# Patient Record
Sex: FEMALE | Race: WHITE | Employment: OTHER | URBAN - METROPOLITAN AREA
[De-identification: names, ages, dates, MRNs, and addresses within clinical notes are randomized per-mention and may not be internally consistent; named-entity substitution may affect disease eponyms.]

---

## 2022-10-31 LAB
AVERAGE GLUCOSE: NORMAL
HBA1C MFR BLD: 5.3 %

## 2023-02-27 ENCOUNTER — INITIAL CONSULT (OUTPATIENT)
Dept: CARDIOLOGY CLINIC | Age: 89
End: 2023-02-27
Payer: MEDICARE

## 2023-02-27 VITALS
HEART RATE: 67 BPM | SYSTOLIC BLOOD PRESSURE: 150 MMHG | BODY MASS INDEX: 23.87 KG/M2 | DIASTOLIC BLOOD PRESSURE: 90 MMHG | HEIGHT: 60 IN | WEIGHT: 121.6 LBS

## 2023-02-27 DIAGNOSIS — I10 PRIMARY HYPERTENSION: ICD-10-CM

## 2023-02-27 DIAGNOSIS — E78.00 PURE HYPERCHOLESTEROLEMIA: ICD-10-CM

## 2023-02-27 DIAGNOSIS — R01.1 MURMUR, CARDIAC: ICD-10-CM

## 2023-02-27 DIAGNOSIS — R06.02 SHORTNESS OF BREATH: Primary | ICD-10-CM

## 2023-02-27 PROCEDURE — 93000 ELECTROCARDIOGRAM COMPLETE: CPT | Performed by: INTERNAL MEDICINE

## 2023-02-27 PROCEDURE — 1090F PRES/ABSN URINE INCON ASSESS: CPT | Performed by: INTERNAL MEDICINE

## 2023-02-27 PROCEDURE — G8427 DOCREV CUR MEDS BY ELIG CLIN: HCPCS | Performed by: INTERNAL MEDICINE

## 2023-02-27 PROCEDURE — 99203 OFFICE O/P NEW LOW 30 MIN: CPT | Performed by: INTERNAL MEDICINE

## 2023-02-27 PROCEDURE — G8420 CALC BMI NORM PARAMETERS: HCPCS | Performed by: INTERNAL MEDICINE

## 2023-02-27 PROCEDURE — 1036F TOBACCO NON-USER: CPT | Performed by: INTERNAL MEDICINE

## 2023-02-27 PROCEDURE — G8484 FLU IMMUNIZE NO ADMIN: HCPCS | Performed by: INTERNAL MEDICINE

## 2023-02-27 PROCEDURE — 1123F ACP DISCUSS/DSCN MKR DOCD: CPT | Performed by: INTERNAL MEDICINE

## 2023-02-27 RX ORDER — OCTISALATE, AVOBENZONE, HOMOSALATE, AND OCTOCRYLENE 29.4; 29.4; 49; 25.48 MG/ML; MG/ML; MG/ML; MG/ML
LOTION TOPICAL DAILY
COMMUNITY

## 2023-02-27 RX ORDER — LANOLIN ALCOHOL/MO/W.PET/CERES
1000 CREAM (GRAM) TOPICAL DAILY
COMMUNITY

## 2023-02-27 RX ORDER — CHLORAL HYDRATE 500 MG
1 CAPSULE ORAL 2 TIMES DAILY WITH MEALS
COMMUNITY

## 2023-02-27 RX ORDER — ACETAMINOPHEN 160 MG
TABLET,DISINTEGRATING ORAL DAILY
COMMUNITY

## 2023-02-27 ASSESSMENT — ENCOUNTER SYMPTOMS
SORE THROAT: 0
BLURRED VISION: 0
SPUTUM PRODUCTION: 0
RHINORRHEA: 0
HEMATOCHEZIA: 0
WHEEZING: 0
VOMITING: 0
HEMOPTYSIS: 0
HEMATEMESIS: 0
SWOLLEN GLANDS: 0
BOWEL INCONTINENCE: 0
COLOR CHANGE: 0
ORTHOPNEA: 0
DIARRHEA: 0
ABDOMINAL PAIN: 0
HOARSE VOICE: 0
SHORTNESS OF BREATH: 1

## 2023-02-27 NOTE — PROGRESS NOTES
[facility] CARDIOLOGY  [address], [address]  PHONE: [phone]        23    NAME:  Connie Baumann  : 1929  MRN: [id number]       SUBJECTIVE:   Connie Baumann is a 81 y.o. female seen for a consultation visit regarding the following: The patient is referred by Dr Josh Harrison for evaluation of SOB. Chief Complaint   Patient presents with    Shortness of Breath            HPI:  Consultation is requested by [unfilled] for evaluation of Shortness of Breath   . Shortness of Breath  This is a recurrent problem. Episode onset: Worse over the past 6 months. The problem occurs intermittently. The problem has been unchanged. Pertinent negatives include no abdominal pain, chest pain, claudication, coryza, ear pain, fever, headaches, hemoptysis, leg pain, leg swelling, neck pain, orthopnea, PND, rash, rhinorrhea, sore throat, sputum production, swollen glands, syncope, vomiting or wheezing. The symptoms are aggravated by exercise. Past Medical History, Past Surgical History, Family history, Social History, and Medications were all reviewed with the patient today and updated as necessary.        No Known Allergies    Current Outpatient Medications:     bevacizumab (AVASTIN) 400 MG/16ML chemo injection, Infuse intravenously once, Disp: , Rfl:     sodium chloride (OCEAN, BABY AYR) 0.65 % nasal spray, 1 spray by Nasal route as needed for Congestion, Disp: , Rfl:     Probiotic Product (ALIGN) 4 MG CAPS, Take by mouth daily, Disp: , Rfl:     Cholecalciferol (VITAMIN D3) 50 MCG ( UT) CAPS, Take by mouth daily, Disp: , Rfl:     dextran 70-hypromellose, PF, (ARTIFICIAL TEARS) 0.1-0.3 % SOLN opthalmic solution, Place 1 drop into both eyes every 4 hours as needed, Disp: , Rfl:     NONFORMULARY, VISION ALIVE MAX, Disp: , Rfl:     BLACK ELDERBERRY PO, Take by mouth daily, Disp: , Rfl:     vitamin B-12 (CYANOCOBALAMIN) 1000 MCG tablet, Take 1,000 mcg by mouth daily, Disp: , Rfl:     Misc Natural Products (BLACK CHERRY CONCENTRATE PO), Take by mouth daily, Disp: , Rfl:     Multiple Vitamins-Minerals (PRESERVISION AREDS 2 PO), Take by mouth daily, Disp: , Rfl:     Omega-3 Fatty Acids (FISH OIL) 1000 MG capsule, Take 1 g by mouth 2 times daily (with meals), Disp: , Rfl:     Propylene Glycol 0.6 % SOLN, Apply to eye 2 times daily, Disp: , Rfl:   Past Medical History:   Diagnosis Date    Cerebral aneurysm     Hyperlipidemia     Hypertension      History reviewed. No pertinent surgical history. History reviewed. No pertinent family history. Social History     Tobacco Use    Smoking status: Never    Smokeless tobacco: Never   Substance Use Topics    Alcohol use: Not Currently       ROS:    Review of Systems   Constitutional: Negative for chills, decreased appetite, diaphoresis, fever and malaise/fatigue. HENT:  Negative for congestion, ear pain, hearing loss, hoarse voice, nosebleeds, rhinorrhea and sore throat. Eyes:  Negative for blurred vision. Cardiovascular:  Positive for dyspnea on exertion. Negative for chest pain, claudication, cyanosis, irregular heartbeat, leg swelling, near-syncope, orthopnea, palpitations, paroxysmal nocturnal dyspnea and syncope. Respiratory:  Positive for shortness of breath. Negative for hemoptysis, sputum production and wheezing. Endocrine: Negative for polydipsia, polyphagia and polyuria. Skin:  Negative for color change and rash. Musculoskeletal:  Negative for neck pain. Gastrointestinal:  Negative for abdominal pain, bowel incontinence, diarrhea, hematemesis, hematochezia and vomiting. Genitourinary:  Negative for dysuria, frequency and hematuria. Neurological:  Positive for vertigo. Negative for focal weakness, headaches, light-headedness, loss of balance, numbness, sensory change and weakness. Psychiatric/Behavioral:  Negative for altered mental status and memory loss.          PHYSICAL EXAM:   BP (!) 150/90   Pulse 67   Ht 5' (1.524 m)   Wt 121 lb 9.6 oz (55.2 kg)   BMI 23.75 kg/m²      Physical Exam  Constitutional:       Appearance: Normal appearance. HENT:      Head: Normocephalic and atraumatic. Nose: Nose normal.   Eyes:      Extraocular Movements: Extraocular movements intact. Pupils: Pupils are equal, round, and reactive to light. Neck:      Vascular: No carotid bruit. Cardiovascular:      Rate and Rhythm: Regular rhythm. Pulses: Normal pulses. Heart sounds: Murmur (2/6 LAWRENCE) heard. Pulmonary:      Effort: Pulmonary effort is normal.      Breath sounds: Normal breath sounds. Abdominal:      General: Abdomen is flat. Bowel sounds are normal.      Palpations: Abdomen is soft. Musculoskeletal:         General: Normal range of motion. Cervical back: Normal range of motion and neck supple. Skin:     General: Skin is warm and dry. Neurological:      General: No focal deficit present. Mental Status: She is alert and oriented to person, place, and time. Psychiatric:         Mood and Affect: Mood normal.       Medical problems and test results were reviewed with the patient today. Results for orders placed or performed in visit on 02/27/23   EKG 12 lead    Impression    Sinus  Rhythm   WITHIN NORMAL LIMITS         11/1/2022:Lipid panel:TC =218,HDL=65,TG=80,NIL=973. ASSESSMENT and PLAN    Dylan Graves was seen today for shortness of breath. Diagnoses and all orders for this visit:    Shortness of breath  -     EKG 12 lead  -     Transthoracic echocardiogram (TTE) complete with contrast, bubble, strain, and 3D PRN; Future    Primary hypertension:Recheck on return visit. Review home BP log. Consider pharmacologic intervention.  -     Transthoracic echocardiogram (TTE) complete with contrast, bubble, strain, and 3D PRN; Future    Murmur, cardiac  -     Transthoracic echocardiogram (TTE) complete with contrast, bubble, strain, and 3D PRN;  Future    Pure hypercholesterolemia:Elevated above goal.Followed by PCP. Disposition:  Return for Follow up after Echo. Thank you for allowing me to participate in this patient's care. Please call or contact me if there are any questions or concerns regarding the above.       Lauren Pina MD  02/27/23  12:37 PM

## 2023-04-24 ENCOUNTER — OFFICE VISIT (OUTPATIENT)
Dept: CARDIOLOGY CLINIC | Age: 89
End: 2023-04-24
Payer: MEDICARE

## 2023-04-24 VITALS
HEART RATE: 80 BPM | WEIGHT: 120.8 LBS | DIASTOLIC BLOOD PRESSURE: 80 MMHG | SYSTOLIC BLOOD PRESSURE: 130 MMHG | BODY MASS INDEX: 23.71 KG/M2 | HEIGHT: 60 IN

## 2023-04-24 DIAGNOSIS — R01.1 MURMUR, CARDIAC: Primary | ICD-10-CM

## 2023-04-24 DIAGNOSIS — R06.02 SHORTNESS OF BREATH: ICD-10-CM

## 2023-04-24 PROCEDURE — 1090F PRES/ABSN URINE INCON ASSESS: CPT | Performed by: INTERNAL MEDICINE

## 2023-04-24 PROCEDURE — 1036F TOBACCO NON-USER: CPT | Performed by: INTERNAL MEDICINE

## 2023-04-24 PROCEDURE — 1123F ACP DISCUSS/DSCN MKR DOCD: CPT | Performed by: INTERNAL MEDICINE

## 2023-04-24 PROCEDURE — 99213 OFFICE O/P EST LOW 20 MIN: CPT | Performed by: INTERNAL MEDICINE

## 2023-04-24 PROCEDURE — G8427 DOCREV CUR MEDS BY ELIG CLIN: HCPCS | Performed by: INTERNAL MEDICINE

## 2023-04-24 PROCEDURE — G8420 CALC BMI NORM PARAMETERS: HCPCS | Performed by: INTERNAL MEDICINE

## 2023-04-24 RX ORDER — MULTIVIT WITH MINERALS/LUTEIN
250 TABLET ORAL DAILY
COMMUNITY

## 2023-04-24 ASSESSMENT — ENCOUNTER SYMPTOMS
COLOR CHANGE: 0
SPUTUM PRODUCTION: 0
COUGH: 0
VOMITING: 0
NAUSEA: 0
VISUAL CHANGE: 0
ABDOMINAL PAIN: 0
SHORTNESS OF BREATH: 0
BOWEL INCONTINENCE: 0
WHEEZING: 0
ORTHOPNEA: 0
HEMATOCHEZIA: 0
SWOLLEN GLANDS: 0
DIARRHEA: 0
HOARSE VOICE: 0
BLURRED VISION: 0
CHANGE IN BOWEL HABIT: 0
SORE THROAT: 0
HEMATEMESIS: 0

## 2023-04-24 NOTE — PROGRESS NOTES
BLACK ELDERBERRY PO, Take by mouth daily, Disp: , Rfl:     vitamin B-12 (CYANOCOBALAMIN) 1000 MCG tablet, Take 1 tablet by mouth daily, Disp: , Rfl:     Misc Natural Products (BLACK CHERRY CONCENTRATE PO), Take by mouth daily, Disp: , Rfl:     Multiple Vitamins-Minerals (PRESERVISION AREDS 2 PO), Take by mouth daily, Disp: , Rfl:   No Known Allergies  Past Medical History:   Diagnosis Date    Cerebral aneurysm     Hyperlipidemia     Hypertension      History reviewed. No pertinent surgical history. History reviewed. No pertinent family history. Social History     Tobacco Use    Smoking status: Never    Smokeless tobacco: Never   Substance Use Topics    Alcohol use: Not Currently       ROS:    Review of Systems   Constitutional: Negative for chills, decreased appetite, diaphoresis, fatigue, fever and malaise/fatigue. HENT:  Negative for congestion, hearing loss, hoarse voice, nosebleeds and sore throat. Eyes:  Negative for blurred vision. Cardiovascular:  Positive for dyspnea on exertion. Negative for chest pain, claudication, cyanosis, irregular heartbeat, leg swelling, near-syncope, orthopnea, palpitations, paroxysmal nocturnal dyspnea and syncope. Respiratory:  Negative for cough, shortness of breath, sputum production and wheezing. Endocrine: Negative for polydipsia, polyphagia and polyuria. Skin:  Negative for color change and rash. Musculoskeletal:  Negative for arthralgias, joint swelling, myalgias and neck pain. Gastrointestinal:  Negative for abdominal pain, anorexia, change in bowel habit, bowel incontinence, diarrhea, hematemesis, hematochezia, nausea and vomiting. Genitourinary:  Negative for dysuria, frequency and hematuria. Neurological:  Negative for focal weakness, headaches, light-headedness, loss of balance, numbness, sensory change, vertigo and weakness. Psychiatric/Behavioral:  Negative for altered mental status and memory loss.           PHYSICAL EXAM:   /80

## 2024-02-06 ENCOUNTER — OFFICE VISIT (OUTPATIENT)
Dept: FAMILY MEDICINE CLINIC | Facility: CLINIC | Age: 89
End: 2024-02-06
Payer: MEDICARE

## 2024-02-06 VITALS
SYSTOLIC BLOOD PRESSURE: 158 MMHG | RESPIRATION RATE: 20 BRPM | BODY MASS INDEX: 24.15 KG/M2 | DIASTOLIC BLOOD PRESSURE: 87 MMHG | OXYGEN SATURATION: 96 % | HEIGHT: 60 IN | TEMPERATURE: 97.8 F | WEIGHT: 123 LBS | HEART RATE: 74 BPM

## 2024-02-06 DIAGNOSIS — I10 PRIMARY HYPERTENSION: ICD-10-CM

## 2024-02-06 DIAGNOSIS — E78.00 PURE HYPERCHOLESTEROLEMIA: ICD-10-CM

## 2024-02-06 DIAGNOSIS — G56.03 BILATERAL CARPAL TUNNEL SYNDROME: ICD-10-CM

## 2024-02-06 DIAGNOSIS — I10 PRIMARY HYPERTENSION: Primary | ICD-10-CM

## 2024-02-06 LAB
ALBUMIN SERPL-MCNC: 3.7 G/DL (ref 3.2–4.6)
ALBUMIN/GLOB SERPL: 1.1 (ref 0.4–1.6)
ALP SERPL-CCNC: 87 U/L (ref 50–136)
ALT SERPL-CCNC: 19 U/L (ref 12–65)
ANION GAP SERPL CALC-SCNC: 3 MMOL/L (ref 2–11)
AST SERPL-CCNC: 17 U/L (ref 15–37)
BASOPHILS # BLD: 0 K/UL (ref 0–0.2)
BASOPHILS NFR BLD: 1 % (ref 0–2)
BILIRUB SERPL-MCNC: 0.5 MG/DL (ref 0.2–1.1)
BUN SERPL-MCNC: 19 MG/DL (ref 8–23)
CALCIUM SERPL-MCNC: 9.3 MG/DL (ref 8.3–10.4)
CHLORIDE SERPL-SCNC: 108 MMOL/L (ref 103–113)
CHOLEST SERPL-MCNC: 189 MG/DL
CO2 SERPL-SCNC: 29 MMOL/L (ref 21–32)
CREAT SERPL-MCNC: 0.8 MG/DL (ref 0.6–1)
DIFFERENTIAL METHOD BLD: ABNORMAL
EOSINOPHIL # BLD: 0.1 K/UL (ref 0–0.8)
EOSINOPHIL NFR BLD: 2 % (ref 0.5–7.8)
ERYTHROCYTE [DISTWIDTH] IN BLOOD BY AUTOMATED COUNT: 13 % (ref 11.9–14.6)
GLOBULIN SER CALC-MCNC: 3.5 G/DL (ref 2.8–4.5)
GLUCOSE SERPL-MCNC: 101 MG/DL (ref 65–100)
HCT VFR BLD AUTO: 39.1 % (ref 35.8–46.3)
HDLC SERPL-MCNC: 61 MG/DL (ref 40–60)
HDLC SERPL: 3.1
HGB BLD-MCNC: 12.4 G/DL (ref 11.7–15.4)
IMM GRANULOCYTES # BLD AUTO: 0 K/UL (ref 0–0.5)
IMM GRANULOCYTES NFR BLD AUTO: 0 % (ref 0–5)
LDLC SERPL CALC-MCNC: 111.2 MG/DL
LYMPHOCYTES # BLD: 1.1 K/UL (ref 0.5–4.6)
LYMPHOCYTES NFR BLD: 26 % (ref 13–44)
MCH RBC QN AUTO: 33.2 PG (ref 26.1–32.9)
MCHC RBC AUTO-ENTMCNC: 31.7 G/DL (ref 31.4–35)
MCV RBC AUTO: 104.8 FL (ref 82–102)
MONOCYTES # BLD: 0.4 K/UL (ref 0.1–1.3)
MONOCYTES NFR BLD: 10 % (ref 4–12)
NEUTS SEG # BLD: 2.6 K/UL (ref 1.7–8.2)
NEUTS SEG NFR BLD: 61 % (ref 43–78)
NRBC # BLD: 0 K/UL (ref 0–0.2)
PLATELET # BLD AUTO: 240 K/UL (ref 150–450)
PMV BLD AUTO: 9.5 FL (ref 9.4–12.3)
POTASSIUM SERPL-SCNC: 4.1 MMOL/L (ref 3.5–5.1)
PROT SERPL-MCNC: 7.2 G/DL (ref 6.3–8.2)
RBC # BLD AUTO: 3.73 M/UL (ref 4.05–5.2)
SODIUM SERPL-SCNC: 140 MMOL/L (ref 136–146)
TRIGL SERPL-MCNC: 84 MG/DL (ref 35–150)
VLDLC SERPL CALC-MCNC: 16.8 MG/DL (ref 6–23)
WBC # BLD AUTO: 4.2 K/UL (ref 4.3–11.1)

## 2024-02-06 PROCEDURE — 1123F ACP DISCUSS/DSCN MKR DOCD: CPT | Performed by: NURSE PRACTITIONER

## 2024-02-06 PROCEDURE — G8420 CALC BMI NORM PARAMETERS: HCPCS | Performed by: NURSE PRACTITIONER

## 2024-02-06 PROCEDURE — 1036F TOBACCO NON-USER: CPT | Performed by: NURSE PRACTITIONER

## 2024-02-06 PROCEDURE — G8427 DOCREV CUR MEDS BY ELIG CLIN: HCPCS | Performed by: NURSE PRACTITIONER

## 2024-02-06 PROCEDURE — 1090F PRES/ABSN URINE INCON ASSESS: CPT | Performed by: NURSE PRACTITIONER

## 2024-02-06 PROCEDURE — G8484 FLU IMMUNIZE NO ADMIN: HCPCS | Performed by: NURSE PRACTITIONER

## 2024-02-06 PROCEDURE — 99213 OFFICE O/P EST LOW 20 MIN: CPT | Performed by: NURSE PRACTITIONER

## 2024-02-06 ASSESSMENT — ENCOUNTER SYMPTOMS
GASTROINTESTINAL NEGATIVE: 1
RESPIRATORY NEGATIVE: 1

## 2024-02-06 ASSESSMENT — PATIENT HEALTH QUESTIONNAIRE - PHQ9
SUM OF ALL RESPONSES TO PHQ QUESTIONS 1-9: 0
1. LITTLE INTEREST OR PLEASURE IN DOING THINGS: 0
SUM OF ALL RESPONSES TO PHQ QUESTIONS 1-9: 0
SUM OF ALL RESPONSES TO PHQ QUESTIONS 1-9: 0
SUM OF ALL RESPONSES TO PHQ9 QUESTIONS 1 & 2: 0
SUM OF ALL RESPONSES TO PHQ QUESTIONS 1-9: 0
2. FEELING DOWN, DEPRESSED OR HOPELESS: 0

## 2024-02-06 NOTE — PROGRESS NOTES
Musculoskeletal:      Thoracic back: Deformity (kyphosis) present.   Neurological:      Mental Status: She is alert and oriented to person, place, and time.   Psychiatric:         Attention and Perception: Attention normal.         Mood and Affect: Mood normal.         Speech: Speech normal.         Behavior: Behavior is cooperative.         Thought Content: Thought content normal.         Assessment/Plan:  1. Primary hypertension    2. Pure hypercholesterolemia    3. Bilateral carpal tunnel syndrome          Maci was seen today for new patient and pt requesting handicap placard.    Diagnoses and all orders for this visit:    Primary hypertension  -     CBC with Auto Differential; Future  -     Comprehensive Metabolic Panel; Future  -     Lipid Panel; Future    Pure hypercholesterolemia  -     CBC with Auto Differential; Future  -     Comprehensive Metabolic Panel; Future  -     Lipid Panel; Future    Bilateral carpal tunnel syndrome  -     [facility]       No orders of the defined types were placed in this encounter.      Return in about 6 months (around 8/6/2024), or with new pcp.    Referral made to [facility] for carpal tunnel bilateral, she is currently wearing splints, labs obtained today will call with results.  Reviewed patients past medical history. We discussed expected course, resolution, and complications of diagnosis in detail . Discussed treatment plan and follow up. Patient to return if symptoms persist or worsen.    VENKATESH Sargent - ADRIA

## 2024-02-07 NOTE — RESULT ENCOUNTER NOTE
Please inform patient overall her labs were ok, her low density cholesterol was slightly elevated at 111, she can lower this number by eating a heart healthy diet low in fat,

## 2024-04-24 ENCOUNTER — OFFICE VISIT (OUTPATIENT)
Age: 89
End: 2024-04-24
Payer: MEDICARE

## 2024-04-24 VITALS
HEIGHT: 60 IN | BODY MASS INDEX: 24.54 KG/M2 | SYSTOLIC BLOOD PRESSURE: 140 MMHG | HEART RATE: 78 BPM | DIASTOLIC BLOOD PRESSURE: 78 MMHG | WEIGHT: 125 LBS

## 2024-04-24 DIAGNOSIS — R01.1 MURMUR, CARDIAC: ICD-10-CM

## 2024-04-24 DIAGNOSIS — I10 PRIMARY HYPERTENSION: Primary | ICD-10-CM

## 2024-04-24 PROCEDURE — G8427 DOCREV CUR MEDS BY ELIG CLIN: HCPCS | Performed by: INTERNAL MEDICINE

## 2024-04-24 PROCEDURE — 1036F TOBACCO NON-USER: CPT | Performed by: INTERNAL MEDICINE

## 2024-04-24 PROCEDURE — G8420 CALC BMI NORM PARAMETERS: HCPCS | Performed by: INTERNAL MEDICINE

## 2024-04-24 PROCEDURE — 1123F ACP DISCUSS/DSCN MKR DOCD: CPT | Performed by: INTERNAL MEDICINE

## 2024-04-24 PROCEDURE — 1090F PRES/ABSN URINE INCON ASSESS: CPT | Performed by: INTERNAL MEDICINE

## 2024-04-24 PROCEDURE — 93000 ELECTROCARDIOGRAM COMPLETE: CPT | Performed by: INTERNAL MEDICINE

## 2024-04-24 PROCEDURE — 99213 OFFICE O/P EST LOW 20 MIN: CPT | Performed by: INTERNAL MEDICINE

## 2024-04-24 ASSESSMENT — ENCOUNTER SYMPTOMS
ABDOMINAL PAIN: 0
ORTHOPNEA: 0
BOWEL INCONTINENCE: 0
WHEEZING: 0
HEMATEMESIS: 0
RHINORRHEA: 0
DIARRHEA: 0
HEMOPTYSIS: 0
COLOR CHANGE: 0
SORE THROAT: 0
SPUTUM PRODUCTION: 0
SHORTNESS OF BREATH: 1
SWOLLEN GLANDS: 0
HEMATOCHEZIA: 0
HOARSE VOICE: 0
VOMITING: 0
BLURRED VISION: 0

## 2024-04-24 NOTE — PROGRESS NOTES
[facility] CARDIOLOGY  [address] [place] [address], [address]  [place] [address]  PHONE: [phone]        24        NAME:  Maci Thomas  : 1929  MRN: [id number]       SUBJECTIVE:   Maci Thomas is a 89 y.o. female seen for a follow up visit regarding the following: She has a hx of moderate MR.She returns for annual follow up.Overall,she reports doing ok.    Chief Complaint   Patient presents with    Hypertension    Heart Murmur       HPI:    Shortness of Breath  This is a chronic problem. The problem has been unchanged. Pertinent negatives include no abdominal pain, chest pain, claudication, coryza, ear pain, fever, headaches, hemoptysis, leg pain, leg swelling, neck pain, orthopnea, PND, rash, rhinorrhea, sore throat, sputum production, swollen glands, syncope, vomiting or wheezing. Exacerbated by: exertion.       Past Medical History, Past Surgical History, Family history, Social History, and Medications were all reviewed with the patient today and updated as necessary.         Current Outpatient Medications:     Ascorbic Acid (VITAMIN C) 500 MG CAPS, Take 500 mg by mouth daily, Disp: , Rfl:     bevacizumab (AVASTIN) 400 MG/16ML chemo injection, Infuse intravenously once, Disp: , Rfl:     sodium chloride (OCEAN, BABY AYR) 0.65 % nasal spray, 1 spray by Nasal route as needed for Congestion, Disp: , Rfl:     Probiotic Product (ALIGN) 4 MG CAPS, Take by mouth daily, Disp: , Rfl:     Cholecalciferol (VITAMIN D3) 50 MCG ( UT) CAPS, Take by mouth daily, Disp: , Rfl:     NONFORMULARY, VISION ALIVE MAX, Disp: , Rfl:     Omega-3 Fatty Acids (FISH OIL) 1000 MG capsule, Take 1 capsule by mouth 2 times daily (with meals), Disp: , Rfl:     Propylene Glycol 0.6 % SOLN, Apply to eye 2 times daily, Disp: , Rfl:     BLACK ELDERBERRY PO, Take by mouth daily, Disp: , Rfl:     vitamin B-12 (CYANOCOBALAMIN) 1000 MCG tablet, Take 1 tablet by mouth daily, Disp: , Rfl:     Multiple Vitamins-Minerals (PRESERVISION

## 2024-07-12 ENCOUNTER — TELEPHONE (OUTPATIENT)
Dept: FAMILY MEDICINE CLINIC | Facility: CLINIC | Age: 89
End: 2024-07-12

## 2024-07-12 NOTE — TELEPHONE ENCOUNTER
----- Message from Franklin Villatoro sent at 7/11/2024 12:20 PM EDT -----  Regarding: E[facility] Appointment Request  ECC Appointment Request    Patient needs appointment for ECC Appointment Type: New to Provider.    Patient Requested Dates(s): Anyday on the month of August  Patient Requested Time: Any time would be fine for the patient  Provider Name: Any provider who has the earliest availability.    Reason for Appointment Request: Established Patient - No appointments available during search: Patient needs to be an established patient in any of the provider who is available for the month of August!  --------------------------------------------------------------------------------------------------------------------------    Relationship to Patient: Self     Call Back Information: OK to leave message on voicemail ( If it has the name of the practice )  Preferred Call Back Number: Phone [phone]

## 2024-10-15 ENCOUNTER — OFFICE VISIT (OUTPATIENT)
Dept: FAMILY MEDICINE CLINIC | Facility: CLINIC | Age: 89
End: 2024-10-15
Payer: MEDICARE

## 2024-10-15 DIAGNOSIS — Z59.82 TRANSPORTATION INSECURITY: ICD-10-CM

## 2024-10-15 DIAGNOSIS — I10 PRIMARY HYPERTENSION: ICD-10-CM

## 2024-10-15 DIAGNOSIS — R01.1 MURMUR, CARDIAC: ICD-10-CM

## 2024-10-15 DIAGNOSIS — Z76.89 ESTABLISHING CARE WITH NEW DOCTOR, ENCOUNTER FOR: Primary | ICD-10-CM

## 2024-10-15 DIAGNOSIS — M40.209 KYPHOSIS, UNSPECIFIED KYPHOSIS TYPE, UNSPECIFIED SPINAL REGION: ICD-10-CM

## 2024-10-15 PROCEDURE — 99213 OFFICE O/P EST LOW 20 MIN: CPT

## 2024-10-15 PROCEDURE — 1123F ACP DISCUSS/DSCN MKR DOCD: CPT

## 2024-10-15 SDOH — ECONOMIC STABILITY - TRANSPORTATION SECURITY: TRANSPORTATION INSECURITY: Z59.82

## 2024-10-15 NOTE — PROGRESS NOTES
[facility] [place] [address]  Phone: [phone] Fax [phone]  Veronica Sarmiento MD      Date of Service: 10/16/2024    Patient: Maci Thomas  8/7/1929 89 y.o. female,Established patient, here for evaluation of the following chief complaint(s):      Chief complaint:   Chief Complaint   Patient presents with    New Patient     Here to establish care    Other     Wanted to discuss paperwork for her new apt       HISTORY OF PRESENTING ILLNESS     Maci Thomas is a 89 y.o. female presented to the clinic to establish with me.   Patient is here to establish care-patient's medical, family, surgical, social history were reviewed.      Sig history of  bilateral carpal tunnel and waiting to hear from [facility]. She has hypertension, hyperlipidemia.  Denies fever, chills, chest pain, , abdominal pain, nausea, vomiting, diarrhea, numbness, tingling, loss of bowel or bladder control    Patient is not on a lot of medications. - mainly just supplement align, omega, vision alive, vit D3, b12, vit C, collagen.       Living with senior living - moved this July in [facility] - following with a therpaist there   Hope to be there till the end   Injured her back during that move and now its getting better.   Everything is new for her   Offer flu vaccines, has the practitioner working there     BP well controlled - patient brought in bp log - most within normal limits     Has some SOB because of her postural /kyphosis is preventing her lung from expanding.  Denies any wheezing     Hasn't had any COVID or flu vaccines       Hypertension patient has been monitoring her blood pressure currently not on any medications her blood pressure ranges from 132/86, 130/83, 146/85, 144/78, 141/78, 127/81.  Pulse ranges from 58-75            Past Medical History:      Diagnosis Date    Cerebral aneurysm     Hyperlipidemia     Hypertension      Past Surgical History:    No past surgical

## 2024-10-16 VITALS
WEIGHT: 111.9 LBS | HEART RATE: 77 BPM | DIASTOLIC BLOOD PRESSURE: 95 MMHG | OXYGEN SATURATION: 95 % | BODY MASS INDEX: 21.97 KG/M2 | SYSTOLIC BLOOD PRESSURE: 153 MMHG | TEMPERATURE: 98 F | HEIGHT: 60 IN

## 2024-10-16 SDOH — ECONOMIC STABILITY: FOOD INSECURITY
WITHIN THE PAST 12 MONTHS, THE FOOD YOU BOUGHT JUST DIDN'T LAST AND YOU DIDN'T HAVE MONEY TO GET MORE.: PATIENT UNABLE TO ANSWER

## 2024-10-16 SDOH — ECONOMIC STABILITY: FOOD INSECURITY
WITHIN THE PAST 12 MONTHS, YOU WORRIED THAT YOUR FOOD WOULD RUN OUT BEFORE YOU GOT MONEY TO BUY MORE.: PATIENT UNABLE TO ANSWER

## 2024-10-16 SDOH — ECONOMIC STABILITY: INCOME INSECURITY
HOW HARD IS IT FOR YOU TO PAY FOR THE VERY BASICS LIKE FOOD, HOUSING, MEDICAL CARE, AND HEATING?: PATIENT UNABLE TO ANSWER

## 2024-10-16 NOTE — ASSESSMENT & PLAN NOTE
Stable without any medications  Continue to monitor patient brought in BP log  Not on any medication  Continue follow-up with cardiology next April

## 2024-10-16 NOTE — ASSESSMENT & PLAN NOTE
Monitored by specialist- no acute findings meriting change in the plan  Stable continue to follow-up with cardiology next appointment in April

## 2025-04-15 ENCOUNTER — OFFICE VISIT (OUTPATIENT)
Dept: FAMILY MEDICINE CLINIC | Facility: CLINIC | Age: 89
End: 2025-04-15
Payer: MEDICARE

## 2025-04-15 VITALS
HEART RATE: 77 BPM | DIASTOLIC BLOOD PRESSURE: 86 MMHG | BODY MASS INDEX: 21.5 KG/M2 | HEIGHT: 60 IN | WEIGHT: 109.5 LBS | TEMPERATURE: 97.7 F | OXYGEN SATURATION: 94 % | SYSTOLIC BLOOD PRESSURE: 132 MMHG

## 2025-04-15 DIAGNOSIS — R73.09 ELEVATED GLUCOSE LEVEL: ICD-10-CM

## 2025-04-15 DIAGNOSIS — E78.2 MIXED HYPERLIPIDEMIA: ICD-10-CM

## 2025-04-15 DIAGNOSIS — Z71.89 ACP (ADVANCE CARE PLANNING): ICD-10-CM

## 2025-04-15 DIAGNOSIS — Z00.00 MEDICARE ANNUAL WELLNESS VISIT, SUBSEQUENT: Primary | ICD-10-CM

## 2025-04-15 DIAGNOSIS — D75.89 MACROCYTOSIS WITHOUT ANEMIA: ICD-10-CM

## 2025-04-15 DIAGNOSIS — G62.9 NEUROPATHY: ICD-10-CM

## 2025-04-15 LAB
ALBUMIN SERPL-MCNC: 3.6 G/DL (ref 3.2–4.6)
ALBUMIN/GLOB SERPL: 1.1 (ref 1–1.9)
ALP SERPL-CCNC: 76 U/L (ref 35–104)
ALT SERPL-CCNC: 20 U/L (ref 8–45)
ANION GAP SERPL CALC-SCNC: 9 MMOL/L (ref 7–16)
AST SERPL-CCNC: 24 U/L (ref 15–37)
BASOPHILS # BLD: 0.03 K/UL (ref 0–0.2)
BASOPHILS NFR BLD: 0.6 % (ref 0–2)
BILIRUB SERPL-MCNC: 0.5 MG/DL (ref 0–1.2)
BUN SERPL-MCNC: 24 MG/DL (ref 8–23)
CALCIUM SERPL-MCNC: 9.7 MG/DL (ref 8.8–10.2)
CHLORIDE SERPL-SCNC: 103 MMOL/L (ref 98–107)
CHOLEST SERPL-MCNC: 204 MG/DL (ref 0–200)
CO2 SERPL-SCNC: 30 MMOL/L (ref 20–29)
CREAT SERPL-MCNC: 0.8 MG/DL (ref 0.6–1.1)
DIFFERENTIAL METHOD BLD: ABNORMAL
EOSINOPHIL # BLD: 0.16 K/UL (ref 0–0.8)
EOSINOPHIL NFR BLD: 3.1 % (ref 0.5–7.8)
ERYTHROCYTE [DISTWIDTH] IN BLOOD BY AUTOMATED COUNT: 13.2 % (ref 11.9–14.6)
EST. AVERAGE GLUCOSE BLD GHB EST-MCNC: 103 MG/DL
FOLATE SERPL-MCNC: 36.8 NG/ML (ref 3.1–17.5)
GLOBULIN SER CALC-MCNC: 3.2 G/DL (ref 2.3–3.5)
GLUCOSE SERPL-MCNC: 114 MG/DL (ref 70–99)
HBA1C MFR BLD: 5.2 % (ref 0–5.6)
HCT VFR BLD AUTO: 39.9 % (ref 35.8–46.3)
HDLC SERPL-MCNC: 70 MG/DL (ref 40–60)
HDLC SERPL: 2.9 (ref 0–5)
HGB BLD-MCNC: 12.6 G/DL (ref 11.7–15.4)
IMM GRANULOCYTES # BLD AUTO: 0.01 K/UL (ref 0–0.5)
IMM GRANULOCYTES NFR BLD AUTO: 0.2 % (ref 0–5)
LDLC SERPL CALC-MCNC: 120 MG/DL (ref 0–100)
LYMPHOCYTES # BLD: 0.95 K/UL (ref 0.5–4.6)
LYMPHOCYTES NFR BLD: 18.1 % (ref 13–44)
MCH RBC QN AUTO: 33.2 PG (ref 26.1–32.9)
MCHC RBC AUTO-ENTMCNC: 31.6 G/DL (ref 31.4–35)
MCV RBC AUTO: 105 FL (ref 82–102)
MONOCYTES # BLD: 0.7 K/UL (ref 0.1–1.3)
MONOCYTES NFR BLD: 13.4 % (ref 4–12)
NEUTS SEG # BLD: 3.39 K/UL (ref 1.7–8.2)
NEUTS SEG NFR BLD: 64.6 % (ref 43–78)
NRBC # BLD: 0 K/UL (ref 0–0.2)
PLATELET # BLD AUTO: 223 K/UL (ref 150–450)
PMV BLD AUTO: 9.8 FL (ref 9.4–12.3)
POTASSIUM SERPL-SCNC: 4.7 MMOL/L (ref 3.5–5.1)
PROT SERPL-MCNC: 6.8 G/DL (ref 6.3–8.2)
RBC # BLD AUTO: 3.8 M/UL (ref 4.05–5.2)
SODIUM SERPL-SCNC: 142 MMOL/L (ref 136–145)
TRIGL SERPL-MCNC: 73 MG/DL (ref 0–150)
VIT B12 SERPL-MCNC: 1197 PG/ML (ref 193–986)
VLDLC SERPL CALC-MCNC: 15 MG/DL (ref 6–23)
WBC # BLD AUTO: 5.2 K/UL (ref 4.3–11.1)

## 2025-04-15 PROCEDURE — 1160F RVW MEDS BY RX/DR IN RCRD: CPT

## 2025-04-15 PROCEDURE — G0439 PPPS, SUBSEQ VISIT: HCPCS

## 2025-04-15 PROCEDURE — 1159F MED LIST DOCD IN RCRD: CPT

## 2025-04-15 PROCEDURE — 1123F ACP DISCUSS/DSCN MKR DOCD: CPT

## 2025-04-15 SDOH — ECONOMIC STABILITY: FOOD INSECURITY: WITHIN THE PAST 12 MONTHS, YOU WORRIED THAT YOUR FOOD WOULD RUN OUT BEFORE YOU GOT MONEY TO BUY MORE.: NEVER TRUE

## 2025-04-15 SDOH — ECONOMIC STABILITY: FOOD INSECURITY: WITHIN THE PAST 12 MONTHS, THE FOOD YOU BOUGHT JUST DIDN'T LAST AND YOU DIDN'T HAVE MONEY TO GET MORE.: NEVER TRUE

## 2025-04-15 ASSESSMENT — PATIENT HEALTH QUESTIONNAIRE - PHQ9
SUM OF ALL RESPONSES TO PHQ QUESTIONS 1-9: 2
2. FEELING DOWN, DEPRESSED OR HOPELESS: SEVERAL DAYS
SUM OF ALL RESPONSES TO PHQ QUESTIONS 1-9: 2
1. LITTLE INTEREST OR PLEASURE IN DOING THINGS: SEVERAL DAYS

## 2025-04-15 ASSESSMENT — LIFESTYLE VARIABLES
HOW MANY STANDARD DRINKS CONTAINING ALCOHOL DO YOU HAVE ON A TYPICAL DAY: 1 OR 2
HOW OFTEN DO YOU HAVE A DRINK CONTAINING ALCOHOL: 2-4 TIMES A MONTH

## 2025-04-15 NOTE — PATIENT INSTRUCTIONS
Patient Education        RSV (Respiratory Syncytial Virus) Vaccine: What You Need to Know  Why get vaccinated?  RSV vaccine can prevent lower respiratory tract disease caused by respiratory syncytial virus (RSV). RSV is a common respiratory virus that usually causes mild, cold-like symptoms.  RSV can cause illness in people of all ages but may be especially serious for infants and older adults.  RSV is the most common cause of hospitalization in [place] infants. Infants up to 12 months of age (especially those 6 months and younger) and children who were born prematurely, or who have chronic lung or heart disease, or a weakened immune system, are at increased risk of severe RSV disease.  RSV infections can be dangerous for certain adults. Adults at highest risk for severe RSV disease include older adults, especially those with chronic heart or lung disease, a weakened immune system, certain other chronic medical conditions, or who live in nursing homes.  RSV spreads through direct contact with the virus, such as when droplets from an infected person’s cough or sneeze contact your eyes, nose, or mouth. It can also be spread by someone touching a surface, such as a doorknob, that has the virus on it, and then touching your face.  Symptoms of RSV infection may include runny nose, decreased appetite, coughing, sneezing, fever, or wheezing. In very young infants, symptoms of RSV may also include irritability (fussiness), decreased activity, or apnea (pauses in breathing for more than 10 seconds).  Most people recover in a week or two, but RSV can be more serious, resulting in shortness of breath and low oxygen levels. RSV can cause bronchiolitis (inflammation of the small airways in the lung) and pneumonia (infection of the lungs). RSV can also lead to worsening of other medical conditions such as asthma, chronic obstructive pulmonary disease (a chronic disease of the lungs that makes it hard to breathe), or heart failure

## 2025-04-15 NOTE — PROGRESS NOTES
Medicare Annual Wellness Visit    Maci Thomas is here for Medicare AWV (Here for AWV - last AWV 11/2/23)    Assessment & Plan  1. Weight loss.  - She has experienced a decrease in weight from 120 pounds to 110 pounds.  - Despite maintaining a balanced diet, she has lost weight.  - Basic laboratory tests will be conducted today to rule out any underlying abnormalities.  - If the weight loss continues or becomes significant, she should inform us immediately.    2. Osteoporosis.  - She has been previously diagnosed with osteoporosis but has not received treatment for it.  - A DEXA scan will be ordered to assess her bone density.  - She is advised to continue with resistance training, vitamin D, and calcium supplements for bone health.  - If the DEXA scan confirms osteoporosis, treatment options will be discussed.    3. Tingling and burning sensation in feet.  - She reports experiencing tingling and burning sensations in her feet, particularly at night.  - Her previous blood work showed an elevated MCV, which could be due to a B12 or folate deficiency.  - Blood work will be done today to check her B12 and folate levels.  - If deficiencies are found, appropriate supplementation will be recommended.    4. Health maintenance.  - She is advised to continue with her current exercise regimen, focusing on strength and balance training.  - Fall precautions were discussed, including the importance of removing tripping hazards like throw rugs and installing grab bars in the bathroom.  - She is also advised to continue with her regular eye injections with Avastin every 5-6 weeks and follow up with her eye doctor every 6 months.  - Additionally, she should continue with her hearing aid services and have her ears cleaned every 6 months.  Medicare annual wellness visit, subsequent  ACP (advance care planning)  -     [facility] - Referral to ACP Clinical Specialist  Macrocytosis without anemia  -     Vitamin B12; Future  -     Folate;

## 2025-04-15 NOTE — ACP (ADVANCE CARE PLANNING)
Advance Care Planning     Advance Care Planning (ACP) Physician/NP/PA Conversation    Date of Conversation: 4/15/2025  Conducted with: Patient with Decision Making Capacity    Healthcare Decision Maker:      Primary Decision Maker: Daniel Tamayo - Other Relative - [phone]    Secondary Decision Maker: Berenice Tamayo - Other Relative - [phone]  They live in [place]     Click here to complete Healthcare Decision Makers including selection of the Healthcare Decision Maker Relationship (ie \"Primary\")  Today we referred to ACP Clinical Specialist for assistance.    Care Preferences:    Hospitalization:  \"If your health worsens and it becomes clear that your chance of recovery is unlikely, what would be your preference regarding hospitalization?\"  The patient would prefer hospitalization.    Ventilation:  \"If you were unable to breath on your own and your chance of recovery was unlikely, what would be your preference about the use of a ventilator (breathing machine) if it was available to you?\"  The patient is unsure.    Resuscitation:  \"In the event your heart stopped as a result of an underlying serious health condition, would you want attempts made to restart your heart, or would you prefer a natural death?\"  The patient is unsure.        Conversation Outcomes / Follow-Up Plan:  ACP in process - information provided, considering goals and options  Reviewed DNR/DNI and patient -will discuss with ACP would like better clarification, currently at the [facility] where she is staying she has a DNR however states that currently the way that she is a healthy she would like to be full code so she would like to continue this discussion    Length of Voluntary ACP Conversation in minutes:  <16 minutes (Non-Billable)    Veronica Sarmiento MD

## 2025-04-16 ENCOUNTER — CLINICAL DOCUMENTATION (OUTPATIENT)
Dept: SPIRITUAL SERVICES | Age: 89
End: 2025-04-16

## 2025-04-16 ENCOUNTER — RESULTS FOLLOW-UP (OUTPATIENT)
Dept: FAMILY MEDICINE CLINIC | Facility: CLINIC | Age: 89
End: 2025-04-16

## 2025-04-16 NOTE — RESULT ENCOUNTER NOTE
Please notify Maci Thomas that her vitamin B12 are elevated if she is taking any supplements to please hold for now, cholesterol slightly elevated hemoglobin A1c is normal.  Please print out patient's blood work and send it to the [facility] with her other forms  ~Thank you  no

## 2025-04-16 NOTE — PROGRESS NOTES
Advance Care Planning   Ambulatory ACP Specialist Patient Outreach    Date:  4/16/2025    ACP Specialist:  Florence Celis    Outreach call to patient in follow-up to ACP Specialist referral from:Veronica Sarmiento MD    [x] PCP  [] Provider   [] Ambulatory Care Management [] Other     For:                  [x] Advance Directive Assistance              [] Complete Portable DNR order              [] Complete POST/POLST/MOST              [] Code Status Discussion             [] Discuss Goals of Care             [] Early ACP Decision-Making              [] Other (Specify)    Date Referral Received:4/15/25    Next Step:   [x] ACP scheduled conversation  [] Outreach again in one week               [x] Email / Mail ACP Info Sheets  [x] Email / Mail Advance Directive   [] Closing referral.  Routing closure to referring provider/staff and to ACP Specialist .    [] Closure letter mailed to patient with invitation to contact ACP Specialist if / when ready.   [] Other (Specify here):       [] At this time, Healthcare Decision Maker Is:         [] Primary agent named in scanned advance directive.    [] Legal Next of Kin.     [x] Unable to determine legal decision maker at this time.    Outreaches:         [x] 1st -  Date:  4/16/25               Intervention:  [] Spoke with Patient   [x] Left Voice mail [] Email / Mail    [] [employer]  [] Other (Specify) :     Outcomes:  Outreach phone call to the patient on mobile phone number which is also listed as the home number.  Unable to reach patient.  Provided contact information on [facility] requesting a return call.  Will attempt to follow up within two weeks.     Addendum:  Date:  4/17/25  Received call back from patient accepting an ACP in-home conversation with specialists Jovanny Gibbs.  E-mail notification sent to specialist to coordinate appointment date and time.               [] 2nd -  Date:                 Intervention:  [] Spoke with Patient  [] Left Voice mail

## 2025-04-17 ENCOUNTER — CLINICAL DOCUMENTATION (OUTPATIENT)
Dept: SPIRITUAL SERVICES | Age: 89
End: 2025-04-17

## 2025-04-18 ENCOUNTER — CLINICAL DOCUMENTATION (OUTPATIENT)
Dept: CARE COORDINATION | Facility: CLINIC | Age: 89
End: 2025-04-18

## 2025-04-24 NOTE — ACP (ADVANCE CARE PLANNING)
Advance Care Planning   Ambulatory ACP Specialist Patient Outreach    Date:  4/18/2025    ACP Specialist:  ALEXANDER AYALA    Outreach call to patient in follow-up to ACP Specialist referral from:Veronica Sarmiento MD    [x] PCP  [] Provider   [] Ambulatory Care Management [] Other     For:                  [x] Advance Directive Assistance              [] Complete Portable DNR order              [] Complete POST/POLST/MOST              [] Code Status Discussion             [] Discuss Goals of Care             [] Early ACP Decision-Making              [x] Other (Specify)    Date Referral Received:4/17/2025    Next Step:   [] ACP scheduled conversation  [x] Outreach again in one week               [] Email / Mail ACP Info Sheets  [] Email / Mail Advance Directive   [] Closing referral.  Routing closure to referring provider/staff and to ACP Specialist .    [] Closure letter mailed to patient with invitation to contact ACP Specialist if / when ready.   [x] Other (Specify here):       [x] At this time, Healthcare Decision Maker Is:         [] Primary agent named in scanned advance directive.    [x] Legal Next of Kin.     [] Unable to determine legal decision maker at this time.    Outreaches:       [] 1st -  Date:                 Intervention:  [] Spoke with Patient   [] Left Voice mail [] Email / Mail    [] MyChart  [] Other (Specify) :     Outcomes:           [] 2nd -  Date:                 Intervention:  [] Spoke with Patient  [] Left Voice mail [] Email / Mail    [] MyChart  [] Other (Specify) :              Outcomes:                [] 3rd -  Date:                Intervention:  [] Spoke with Patient   [] Left Voice mail [] Email / Mail    [] MyChart  [] Other (Specify) :       Outcomes:           [x]  Additional Outreach -  Date:  4/18/2025   (Specify Dates & special circumstances):    Outcomes: Called patient and LVM requesting return call without response by end of day. Will follow again next week.

## 2025-04-28 ENCOUNTER — CLINICAL DOCUMENTATION (OUTPATIENT)
Dept: CARE COORDINATION | Facility: CLINIC | Age: 89
End: 2025-04-28

## 2025-05-16 ENCOUNTER — CLINICAL DOCUMENTATION (OUTPATIENT)
Dept: CARE COORDINATION | Facility: CLINIC | Age: 89
End: 2025-05-16

## 2025-06-06 ENCOUNTER — TELEPHONE (OUTPATIENT)
Dept: FAMILY MEDICINE CLINIC | Facility: CLINIC | Age: 89
End: 2025-06-06

## 2025-06-06 NOTE — TELEPHONE ENCOUNTER
Pt will consult with Sarah, who gives her rides, will be calling us on Monday 6/9 to schedule an appt for Maci

## 2025-06-06 NOTE — TELEPHONE ENCOUNTER
Patient called wanting to speak to Rudolph. I tried to schedule with her, very persistant on speaking with Rudolph only. Asked you call her back

## 2025-07-14 ENCOUNTER — OFFICE VISIT (OUTPATIENT)
Dept: SURGERY | Age: 89
End: 2025-07-14
Payer: MEDICARE

## 2025-07-14 VITALS
SYSTOLIC BLOOD PRESSURE: 120 MMHG | WEIGHT: 109 LBS | BODY MASS INDEX: 21.4 KG/M2 | HEART RATE: 71 BPM | DIASTOLIC BLOOD PRESSURE: 72 MMHG | HEIGHT: 60 IN | OXYGEN SATURATION: 95 %

## 2025-07-14 DIAGNOSIS — R06.02 SHORTNESS OF BREATH: Primary | ICD-10-CM

## 2025-07-14 DIAGNOSIS — K44.9 HIATAL HERNIA: Primary | ICD-10-CM

## 2025-07-14 PROCEDURE — 99204 OFFICE O/P NEW MOD 45 MIN: CPT | Performed by: SURGERY

## 2025-07-14 PROCEDURE — 1160F RVW MEDS BY RX/DR IN RCRD: CPT | Performed by: SURGERY

## 2025-07-14 PROCEDURE — 1090F PRES/ABSN URINE INCON ASSESS: CPT | Performed by: SURGERY

## 2025-07-14 PROCEDURE — G8420 CALC BMI NORM PARAMETERS: HCPCS | Performed by: SURGERY

## 2025-07-14 PROCEDURE — 1159F MED LIST DOCD IN RCRD: CPT | Performed by: SURGERY

## 2025-07-14 PROCEDURE — G8427 DOCREV CUR MEDS BY ELIG CLIN: HCPCS | Performed by: SURGERY

## 2025-07-14 PROCEDURE — 1123F ACP DISCUSS/DSCN MKR DOCD: CPT | Performed by: SURGERY

## 2025-07-14 PROCEDURE — 1036F TOBACCO NON-USER: CPT | Performed by: SURGERY

## 2025-07-14 NOTE — PROGRESS NOTES
tablet Take 1 tablet by mouth daily (Patient not taking: Reported on 7/14/2025)       No current facility-administered medications for this visit.        ALLERGIES:      No Known Allergies    SH:    Social History     Tobacco Use    Smoking status: Never    Smokeless tobacco: Never   Substance Use Topics    Alcohol use: Not Currently       FH:    History reviewed. No pertinent family history.    Review of systems:  A 13pt review of systems is unremarkable unless otherwise stated in HPI.     Physical Exam  Constitutional:       General: She is not in acute distress.     Appearance: Normal appearance.   HENT:      Head: Normocephalic and atraumatic.   Eyes:      Pupils: Pupils are equal, round, and reactive to light.   Cardiovascular:      Rate and Rhythm: Normal rate and regular rhythm.      Pulses: Normal pulses.      Heart sounds: Normal heart sounds.   Pulmonary:      Effort: Pulmonary effort is normal.      Breath sounds: Normal breath sounds.   Abdominal:      Palpations: Abdomen is soft. There is no mass.      Tenderness: There is no abdominal tenderness.   Musculoskeletal:         General: Normal range of motion.   Skin:     Findings: No rash.   Neurological:      Mental Status: She is alert and oriented to person, place, and time.          /72   Pulse 71   Ht 1.524 m (5')   Wt 49.4 kg (109 lb)   SpO2 95%   BMI 21.29 kg/m²       Labs:     No results found for this or any previous visit (from the past 24 hours).    Imaging:     I have independently reviewed all pertinent imaging     Transthoracic echocardiogram (TTE) complete with contrast, bubble, strain, and 3D PRN    Left Ventricle: Normal left ventricular systolic function. EF by 2D   Simpsons Biplane is 66%. Left ventricle size is normal. Moderately   increased wall thickness. Findings consistent with mild asymmetric   hypertrophy. Normal wall motion. Abnormal diastolic function. Average E/e'   ratio is 19.47.    Aortic Valve: Valve structure is

## 2025-08-11 ENCOUNTER — HOSPITAL ENCOUNTER (OUTPATIENT)
Dept: CT IMAGING | Age: 89
Discharge: HOME | End: 2025-08-13
Attending: SURGERY
Payer: MEDICARE

## 2025-08-11 DIAGNOSIS — K44.9 HIATAL HERNIA: ICD-10-CM

## 2025-08-11 LAB — CREAT BLD-MCNC: 0.74 MG/DL (ref 0.8–1.5)

## 2025-08-11 PROCEDURE — 82565 ASSAY OF CREATININE: CPT

## 2025-08-11 PROCEDURE — 6360000004 HC RX CONTRAST MEDICATION: Performed by: SURGERY

## 2025-08-11 PROCEDURE — 74177 CT ABD & PELVIS W/CONTRAST: CPT

## 2025-08-11 RX ORDER — IOPAMIDOL 755 MG/ML
100 INJECTION, SOLUTION INTRAVASCULAR
Status: COMPLETED | OUTPATIENT
Start: 2025-08-11 | End: 2025-08-11

## 2025-08-11 RX ADMIN — IOPAMIDOL 100 ML: 755 INJECTION, SOLUTION INTRAVENOUS at 13:58
